# Patient Record
Sex: MALE | Race: WHITE | NOT HISPANIC OR LATINO | URBAN - METROPOLITAN AREA
[De-identification: names, ages, dates, MRNs, and addresses within clinical notes are randomized per-mention and may not be internally consistent; named-entity substitution may affect disease eponyms.]

---

## 2020-04-22 ENCOUNTER — COMMUNICATION - HEALTHEAST (OUTPATIENT)
Dept: SCHEDULING | Facility: CLINIC | Age: 25
End: 2020-04-22

## 2021-06-07 NOTE — TELEPHONE ENCOUNTER
"\"I have a cyst on my back.\" \"It has gotten bigger.\" Patient is requesting to schedule appointment for \"removal.\" Symptoms starting 2 years ago.    Call disconnected. Attempted to reach patient back x 2 at  call rolls directly to voicemail.     Triage is not complete.    Sandra Cohn RN  [facility]    "

## 2021-12-11 ENCOUNTER — OFFICE VISIT (OUTPATIENT)
Dept: FAMILY MEDICINE | Facility: CLINIC | Age: 26
End: 2021-12-11
Payer: COMMERCIAL

## 2021-12-11 VITALS
SYSTOLIC BLOOD PRESSURE: 116 MMHG | WEIGHT: 183 LBS | OXYGEN SATURATION: 98 % | DIASTOLIC BLOOD PRESSURE: 65 MMHG | HEART RATE: 73 BPM | RESPIRATION RATE: 16 BRPM | TEMPERATURE: 97.6 F

## 2021-12-11 DIAGNOSIS — H66.92 LEFT OTITIS MEDIA, UNSPECIFIED OTITIS MEDIA TYPE: Primary | ICD-10-CM

## 2021-12-11 DIAGNOSIS — H61.22 IMPACTED CERUMEN OF LEFT EAR: ICD-10-CM

## 2021-12-11 DIAGNOSIS — R23.8 DRY SCALP: ICD-10-CM

## 2021-12-11 DIAGNOSIS — H65.92 OME (OTITIS MEDIA WITH EFFUSION), LEFT: ICD-10-CM

## 2021-12-11 PROCEDURE — 69209 REMOVE IMPACTED EAR WAX UNI: CPT | Mod: LT | Performed by: NURSE PRACTITIONER

## 2021-12-11 PROCEDURE — 99202 OFFICE O/P NEW SF 15 MIN: CPT | Mod: 25 | Performed by: NURSE PRACTITIONER

## 2021-12-11 RX ORDER — SELENIUM SULFIDE 23 MG/ML
1 SHAMPOO TOPICAL
Qty: 180 ML | Refills: 0 | Status: SHIPPED | OUTPATIENT
Start: 2021-12-13 | End: 2021-12-11

## 2021-12-11 ASSESSMENT — ENCOUNTER SYMPTOMS
SORE THROAT: 0
FEVER: 0
CHILLS: 0
COUGH: 0

## 2021-12-12 NOTE — PROGRESS NOTES
Assessment & Plan     Impacted cerumen of left ear    - REMOVE IMPACTED CERUMEN    Dry scalp    - Adult Dermatology Referral    OME (otitis media with effusion), left      Left otitis media, unspecified otitis media type    - amoxicillin-clavulanate (AUGMENTIN) 875-125 MG tablet  Dispense: 14 tablet; Refill: 0     After removal of cerumen, patient states his hearing improved.  Dull, erythematous tympanic membrane-we will treat with Augmentin.    Multiple questions that should be addressed with primary care -none are urgent in nature.  Given the [phone] number to make an appointment with PCP.  Extensive scalp dryness and flakiness.  Should see dermatology for this; possible scalp psoriasis.  Continue unknown oil that he was given as a prescription product in the past which he states is at home    22 minutes spent on the date of the encounter doing chart review, patient visit and documentation         No follow-ups on file.    Courtney Crawford [facility]    Pearl Rojas is a 26 year old male who presents to clinic today for the following health issues:  Chief Complaint   Patient presents with     Otalgia     left ear pain since 12/7/21, harder to hear feels plugged, sounds like water, if yawns will pop     HPI    No recent cough/congestion/planes     Left ear plugging.  Will pop with yawning or similar at times.      Mild ear pain .  Started 3 days ago.     Also wants to know what to do about longstanding scalp erythema and dandruff, ongoing at least 2-3 years.   Has never seen derm for this.  Patient believes this is psoriasis, but has never had a formal diagnosis.  Has some type of oil that he was given that he puts on this.  Is not sure what it is.  Has not been using it regularly.  Thinks she has some left.  It does help when he does use it.  Patient does not think he has ever used this.    Third question is intermittent small papular red bumps located around the belt  line.  He will apply hydrocortisone cream to this nail going within 1 week.  Is wondering what to do.        Review of Systems   Constitutional: Negative for chills and fever.   HENT: Positive for ear pain. Negative for congestion and sore throat.    Respiratory: Negative for cough.            Objective    /65   Pulse 73   Temp 97.6  F (36.4  C) (Oral)   Resp 16   Wt 83 kg (183 lb)   SpO2 98%   Physical Exam  Constitutional:       Appearance: He is well-developed.   HENT:      Right Ear: Tympanic membrane and external ear normal.      Left Ear: External ear normal. There is impacted cerumen. Tympanic membrane is erythematous (Dull, erythematous, no visible landmarks after cerumen removed.).   Eyes:      General:         Right eye: No discharge.         Left eye: No discharge.      Conjunctiva/sclera: Conjunctivae normal.   Pulmonary:      Effort: Pulmonary effort is normal.   Musculoskeletal:         General: Normal range of motion.   Skin:     General: Skin is warm.      Findings: Erythema (Dry, flaky erythematous scalp - most of scalp. ) present.      Comments: 2 2 mm papules located at beltline.     Neurological:      Mental Status: He is alert and oriented to person, place, and time.   Psychiatric:         Mood and Affect: Mood normal.         Behavior: Behavior normal.         Thought Content: Thought content normal.         Judgment: Judgment normal.

## 2021-12-12 NOTE — PATIENT INSTRUCTIONS
Use the oil that you were given as directed.  If this is a prescription product, you may refill it with the provider who originally filled it.  I have put in a dermatology consult to better address this.    Augmentin for your ear.  Recommend eating yogurt once or twice a day to help with diarrhea side effect.    Recheck in 3 to 4 days if your ear does not see much better.    Things like acne can be addressed with primary care your convenience.  Make an appointment to discuss.

## 2022-02-04 ENCOUNTER — OFFICE VISIT (OUTPATIENT)
Dept: FAMILY MEDICINE | Facility: CLINIC | Age: 27
End: 2022-02-04
Payer: COMMERCIAL

## 2022-02-04 VITALS
DIASTOLIC BLOOD PRESSURE: 84 MMHG | RESPIRATION RATE: 16 BRPM | HEART RATE: 81 BPM | WEIGHT: 183.6 LBS | OXYGEN SATURATION: 94 % | SYSTOLIC BLOOD PRESSURE: 128 MMHG

## 2022-02-04 DIAGNOSIS — R21 RASH: ICD-10-CM

## 2022-02-04 DIAGNOSIS — L21.9 SEBORRHEIC DERMATITIS: ICD-10-CM

## 2022-02-04 DIAGNOSIS — H92.12 OTORRHEA, LEFT: Primary | ICD-10-CM

## 2022-02-04 PROCEDURE — 99213 OFFICE O/P EST LOW 20 MIN: CPT | Performed by: PHYSICIAN ASSISTANT

## 2022-02-04 RX ORDER — OFLOXACIN 3 MG/ML
5 SOLUTION AURICULAR (OTIC) 2 TIMES DAILY
Qty: 10 ML | Refills: 0 | Status: SHIPPED | OUTPATIENT
Start: 2022-02-04 | End: 2022-02-11

## 2022-02-04 RX ORDER — FLUOCINONIDE TOPICAL SOLUTION USP, 0.05% 0.5 MG/ML
SOLUTION TOPICAL 2 TIMES DAILY
Qty: 60 ML | Refills: 1 | Status: SHIPPED | OUTPATIENT
Start: 2022-02-04

## 2022-02-04 RX ORDER — HYDROCORTISONE 25 MG/G
CREAM TOPICAL 2 TIMES DAILY PRN
Qty: 30 G | Refills: 1 | Status: SHIPPED | OUTPATIENT
Start: 2022-02-04

## 2022-02-04 RX ORDER — KETOCONAZOLE 20 MG/ML
SHAMPOO TOPICAL DAILY PRN
Qty: 100 ML | Refills: 1 | Status: SHIPPED | OUTPATIENT
Start: 2022-02-04

## 2022-02-04 NOTE — PROGRESS NOTES
Assessment & Plan     Otorrhea, left  I discussed with patient that it is difficult to know exact etiology of his otorrhea as he has been irrigating his ear with both water and hydrogen peroxide, though this is a thick white discharge.  He has no pain and thus not likely to represent an otitis externa.  He has had several month history of aural fullness and change in his hearing.  Differential diagnosis includes but is not limited to TM perforation with the use of his Q-tips and cholesteatoma.  I have recommended he follow-up with ENT for further evaluation and treatment and consideration of formal audiogram if needed as well.  - ofloxacin (FLOXIN) 0.3 % otic solution  Dispense: 10 mL; Refill: 0  - Otolaryngology Referral    Rash    - hydrocortisone, Perianal, (HYDROCORTISONE) 2.5 % cream  Dispense: 30 g; Refill: 1    Seborrheic dermatitis  We will have patient start Nizoral shampoo he may use daily x1 week and then decrease to twice a week.  He may use the Lidex topical steroid as needed for more severe symptoms as he has had improvement with this in the past..- fluocinonide (LIDEX) 0.05 % external solution  Dispense: 60 mL; Refill: 1  - ketoconazole (NIZORAL) 2 % external shampoo  Dispense: 100 mL; Refill: 1     I have recommended patient establish care with a primary care provider for ongoing chronic disease management and further evaluation of his difficulty concentrating with underlying history of ADHD in the past.  [id number]    Return if symptoms worsen or fail to improve.    Deanna Aiken PA-C  [facility]    Pearl Rojas is a 26 year old male who presents to clinic today for the following health issues:  Chief Complaint   Patient presents with     Otalgia     L ear pain x2 months      HPI  Patient is a 26-year-old male who presents to urgent care with concerns regarding ongoing left ear hearing change.  He notes a plugged ear sensation and aural fullness.  He denies any  tinnitus or dizziness.  He has not noted discharge from his ear however states since having his ears washed out 2 weeks ago he did not continue the Augmentin for any period of time.  He felt his symptoms had resolved after cerumen was removed.  However the symptoms continued to worsen.  He denies any ear pain or tenderness to the touch of the left ear.  He has been washing his ear out in the shower regularly and has also put some hydrogen peroxide in his ear.    Muffled ear sensation intermittent, for months.    No popping or crackling    He denies any known allergies  Rhinorrhea intermittent as well, wonders if it is the weather.  Sneezing.  No eye sx.      In addition patient has questions regarding his scalp flaking and irritation.  He believes it may be psoriasis though he does not have any additional rash on the remainder of his body.  He had a dermatology referral at his last visit but is unable to get in for any period of time.  He has used Lidex solution in the past which seems to be helpful he wonders if there is any other treatment that might be helpful.    Finally patient has intermittent perianal itching for which she has been given hydrocortisone 2.5% in the past which has been helpful.  He uses it infrequently and wonders if he can have a refill.    Finally patient brings up concerns regarding difficulty focusing in and history of ADHD.  He currently is not on any stimulants and has not been for many years.  I have recommended he follow-up with primary care provider for this problem as it is sensible scope of urgent care clinic.    Review of Systems  Constitutional, HEENT, cardiovascular, pulmonary, gi and gu systems are negative, except as otherwise noted.      Objective    /84 (BP Location: Right arm, Patient Position: Sitting, Cuff Size: Adult Regular)   Pulse 81   Resp 16   Wt 83.3 kg (183 lb 9.6 oz)   SpO2 94%   Physical Exam   Pt is in no acute distress and appears well  Ears patent B:   TM  intact, non-injected on the R.  All land marks easily visibile.  L TM not visible due to moderate white otorrhea.  No TTP of the L auricle or pinna. No tenderness with palpation of the tragus.  Hearing grossly inact.    Nasal mucosa is non-edematous, no discharge.    Pharynx: non erythematous, tonsils non hypertrophied, No exudate   Neck supple: no adenopathy  Lungs: CTA  Heart: RRR, no murmur, no thrills or heaves   Ext: no edema  Skin: dry flaking and mild erythema along hairline anteriorly and posteriorly, around ears.  None on eyebrows or nasolabial folds.

## 2022-02-04 NOTE — PATIENT INSTRUCTIONS
Please follow up with ENT in the next 2-3 weeks for your ear problem, drainage and decreased hearing.

## 2022-02-07 NOTE — TELEPHONE ENCOUNTER
FUTURE VISIT INFORMATION      FUTURE VISIT INFORMATION:    Date: 3/17/22    Time: 1:30PM    Location: [facility]  REFERRAL INFORMATION:    Referring provider:  Deanna Jessica PA-C    Referring providers clinic:  [facility]    Reason for visit/diagnosis  Per Pt, dx Otorrhea referral from DEANNA JESSICA - patient has [employer]    RECORDS REQUESTED FROM:       Clinic name Comments Records Status Imaging Status   [facility] 2/4/22 note from Deanna Jessica PA-C  12/11/21 note from Courtney Crawford, CNP   [facility]

## 2022-02-16 ENCOUNTER — TELEPHONE (OUTPATIENT)
Dept: OTOLARYNGOLOGY | Facility: CLINIC | Age: 27
End: 2022-02-16
Payer: COMMERCIAL

## 2022-02-16 NOTE — TELEPHONE ENCOUNTER
Left msg for the pt. Want to move pt's appt up to see Idania Alexander- any return slot without WIN needed.     Samantha Gracia LPN

## 2022-03-17 ENCOUNTER — PRE VISIT (OUTPATIENT)
Dept: OTOLARYNGOLOGY | Facility: CLINIC | Age: 27
End: 2022-03-17
Payer: COMMERCIAL

## 2025-02-04 ENCOUNTER — TELEPHONE (OUTPATIENT)
Dept: FAMILY MEDICINE | Facility: CLINIC | Age: 30
End: 2025-02-04

## 2025-02-04 NOTE — TELEPHONE ENCOUNTER
New after many years, left a voicemail that this MUST be an inperson visit per [place] guidelines. Please assist in scheduling this as inperson, or have him arrive today @ 1:20.